# Patient Record
Sex: MALE | Race: WHITE | NOT HISPANIC OR LATINO | Employment: FULL TIME | URBAN - METROPOLITAN AREA
[De-identification: names, ages, dates, MRNs, and addresses within clinical notes are randomized per-mention and may not be internally consistent; named-entity substitution may affect disease eponyms.]

---

## 2020-04-03 ENCOUNTER — TELEMEDICINE (OUTPATIENT)
Dept: FAMILY MEDICINE CLINIC | Facility: CLINIC | Age: 60
End: 2020-04-03

## 2020-04-03 VITALS — HEIGHT: 68 IN | WEIGHT: 172 LBS | BODY MASS INDEX: 26.07 KG/M2

## 2020-04-03 DIAGNOSIS — Z79.899 HIGH RISK MEDICATION USE: ICD-10-CM

## 2020-04-03 DIAGNOSIS — G40.802 OTHER EPILEPSY WITHOUT STATUS EPILEPTICUS, NOT INTRACTABLE (HCC): ICD-10-CM

## 2020-04-03 DIAGNOSIS — I10 ESSENTIAL HYPERTENSION: ICD-10-CM

## 2020-04-03 DIAGNOSIS — E03.9 ACQUIRED HYPOTHYROIDISM: Primary | ICD-10-CM

## 2020-04-03 DIAGNOSIS — N18.30 CHRONIC RENAL INSUFFICIENCY, STAGE III (MODERATE) (HCC): ICD-10-CM

## 2020-04-03 PROBLEM — G40.909: Status: ACTIVE | Noted: 2020-04-03

## 2020-04-03 PROCEDURE — 99214 OFFICE O/P EST MOD 30 MIN: CPT | Performed by: FAMILY MEDICINE

## 2020-04-03 RX ORDER — PHENYTOIN SODIUM 100 MG/1
300 CAPSULE, EXTENDED RELEASE ORAL DAILY
Qty: 270 CAPSULE | Refills: 1 | Status: SHIPPED | OUTPATIENT
Start: 2020-04-03

## 2020-04-03 RX ORDER — LISINOPRIL 20 MG/1
20 TABLET ORAL DAILY
COMMUNITY
Start: 2020-03-19 | End: 2020-04-03 | Stop reason: SDUPTHER

## 2020-04-03 RX ORDER — LEVOTHYROXINE SODIUM 0.2 MG/1
200 TABLET ORAL DAILY
COMMUNITY
End: 2020-04-03 | Stop reason: SDUPTHER

## 2020-04-03 RX ORDER — PHENYTOIN SODIUM 100 MG/1
300 CAPSULE, EXTENDED RELEASE ORAL DAILY
COMMUNITY
End: 2020-04-03 | Stop reason: SDUPTHER

## 2020-04-03 RX ORDER — LISINOPRIL 20 MG/1
20 TABLET ORAL DAILY
Qty: 90 TABLET | Refills: 1 | Status: SHIPPED | OUTPATIENT
Start: 2020-04-03

## 2020-04-03 RX ORDER — LEVOTHYROXINE SODIUM 0.2 MG/1
200 TABLET ORAL DAILY
Qty: 90 TABLET | Refills: 1 | Status: SHIPPED | OUTPATIENT
Start: 2020-04-03

## 2020-04-03 NOTE — PROGRESS NOTES
Subjective   Jerod Blum is a 59 y.o. male.     There were no vitals filed for this visit.     Chief Complaint   Patient presents with   • Hypothyroidism     new to get established wit [facility]   • Hypertension   • Seizures        History of Present Illness    4-month follow-up on hypothyroidism, hypertension, seizure disorder, and chronic kidney disease    Patient's regularly scheduled appointment was converted to a video visit given new CDC recommendations and guidelines regarding her current coronavirus pandemic    Patient's last office visit with me at [facility] was approximately November or December 2019 for follow-up labs and medication refills.  Unfortunately, patient's record is unavailable at this time.    According to the patient at her last office visit we had decreased patient's levothyroxine from 250 mcg a day to 200 mcg/day.  He is tolerating this decreased dose fine without any adverse side effects.  He reports his weight has been stable and has no complaints.  Unfortunately, due to the closing of one practice and beginning the next he has not been able to have his TSH value rechecked.    Patient's hypertension has been well controlled with lisinopril.  He does need a refill today    Patient seizure disorder has been well controlled with Dilantin 300 mg daily.  Of note, about 1 to 2 years ago he was inquiring about this medication as he has had no seizures in many, many years.  He was sent to a neurologist at that time yet given the co-pay money that they asked for upfront and he never followed through with that appointment.  Thus, he has been maintained on Dilantin with no breakthrough seizure.  Needs refill today    Patient's chronic kidney disease has been stable.  Continues to remain off all anti-inflammatories.  I believe he has seen a nephrologist in the past yet he reports that he no longer sees him.    The following portions of the patient's history were reviewed and updated as appropriate:  allergies, current medications, past family history, past medical history, past social history, past surgical history and problem list.    Review of Systems   Constitutional: Negative for unexpected weight gain and unexpected weight loss.   Respiratory: Negative for shortness of breath.    Cardiovascular: Negative for chest pain.   I have reviewed and confirmed the accuracy of the ROS as documented by the MA/LPN/RN Colleen Murillo MD      Objective   Physical Exam   Constitutional: He appears well-developed and well-nourished.   HENT:   Head: Normocephalic and atraumatic.   Pulmonary/Chest: Effort normal.   Psychiatric: He has a normal mood and affect. His behavior is normal. Judgment and thought content normal.   Nursing note reviewed.       LABS/STUDIES --records unavailable, yet TSH value reportedly low in November 2019    Procedures     Assessment/Plan   Jerod was seen today for hypothyroidism, hypertension and seizures.    Diagnoses and all orders for this visit:    Acquired hypothyroidism --uncontrolled, will recheck TSH and free T4 today.  If therapeutic will continue to refill levothyroxine 200 mcg 1 p.o. every morning  -     TSH; Future  -     T4, Free; Future    Essential hypertension --- stable, will refill lisinopril 20 mg 1 p.o. Daily.  Continue to encourage patient healthy lifestyle with low-fat/low calorie diet and increase cardio exercise greater than 150 minutes weekly.  Also have encouraged patient to discontinue tobacco!  -     Comprehensive Metabolic Panel; Future  -     Lipid Panel With LDL / HDL Ratio; Future    Chronic renal insufficiency, stage III (moderate) (CMS/HCC) --stable, will recheck renal function.  Patient is to remain off anti-inflammatories    Other epilepsy without status epilepticus, not intractable (CMS/HCC) --stable, refill Dilantin 100 mg patient is to take 3 p.o. daily.  Also needs Dilantin level checked due to high risk medication  -     Phenytoin level, total;  Future    High risk medication use  -     Phenytoin level, total; Future    Other orders  -     levothyroxine (SYNTHROID, LEVOTHROID) 200 MCG tablet; Take 1 tablet by mouth Daily.  -     lisinopril (PRINIVIL,ZESTRIL) 20 MG tablet; Take 1 tablet by mouth Daily.  -     phenytoin (DILANTIN) 100 MG ER capsule; Take 3 capsules by mouth Daily.                 Return in about 6 months (around 10/3/2020).

## 2020-04-03 NOTE — PATIENT INSTRUCTIONS
Continue current treatment plan.  Get lab work in about 6 to 8 weeks and if all normal can follow-up in 6 months  Stop smoking!

## 2020-05-22 ENCOUNTER — RESULTS ENCOUNTER (OUTPATIENT)
Dept: FAMILY MEDICINE CLINIC | Facility: CLINIC | Age: 60
End: 2020-05-22

## 2020-05-22 DIAGNOSIS — E03.9 ACQUIRED HYPOTHYROIDISM: ICD-10-CM

## 2020-05-29 ENCOUNTER — RESULTS ENCOUNTER (OUTPATIENT)
Dept: FAMILY MEDICINE CLINIC | Facility: CLINIC | Age: 60
End: 2020-05-29

## 2020-05-29 DIAGNOSIS — G40.802 OTHER EPILEPSY WITHOUT STATUS EPILEPTICUS, NOT INTRACTABLE (HCC): ICD-10-CM

## 2020-05-29 DIAGNOSIS — Z79.899 HIGH RISK MEDICATION USE: ICD-10-CM

## 2020-05-29 DIAGNOSIS — I10 ESSENTIAL HYPERTENSION: ICD-10-CM

## 2020-05-29 DIAGNOSIS — E03.9 ACQUIRED HYPOTHYROIDISM: ICD-10-CM

## 2021-03-24 ENCOUNTER — BULK ORDERING (OUTPATIENT)
Dept: CASE MANAGEMENT | Facility: OTHER | Age: 61
End: 2021-03-24

## 2021-03-24 DIAGNOSIS — Z23 IMMUNIZATION DUE: ICD-10-CM

## 2021-04-02 RX ORDER — PHENYTOIN SODIUM 100 MG/1
300 CAPSULE, EXTENDED RELEASE ORAL DAILY
Qty: 270 CAPSULE | Refills: 1 | OUTPATIENT
Start: 2021-04-02

## 2021-04-05 NOTE — TELEPHONE ENCOUNTER
** hub to read** patient has not had an in office appointment yet,  will not refill until patient is seen in the office.

## 2021-05-20 RX ORDER — LISINOPRIL 20 MG/1
TABLET ORAL
Qty: 90 TABLET | Refills: 0 | OUTPATIENT
Start: 2021-05-20